# Patient Record
Sex: FEMALE | Race: WHITE | NOT HISPANIC OR LATINO | Employment: FULL TIME | RURAL
[De-identification: names, ages, dates, MRNs, and addresses within clinical notes are randomized per-mention and may not be internally consistent; named-entity substitution may affect disease eponyms.]

---

## 2023-04-12 ENCOUNTER — OFFICE VISIT (OUTPATIENT)
Dept: FAMILY MEDICINE CLINIC | Facility: CLINIC | Age: 33
End: 2023-04-12
Payer: COMMERCIAL

## 2023-04-12 VITALS
BODY MASS INDEX: 33.27 KG/M2 | HEIGHT: 66 IN | SYSTOLIC BLOOD PRESSURE: 108 MMHG | OXYGEN SATURATION: 98 % | DIASTOLIC BLOOD PRESSURE: 70 MMHG | WEIGHT: 207 LBS | HEART RATE: 55 BPM

## 2023-04-12 DIAGNOSIS — Z13.29 THYROID DISORDER SCREENING: ICD-10-CM

## 2023-04-12 DIAGNOSIS — F41.9 ANXIETY: ICD-10-CM

## 2023-04-12 DIAGNOSIS — Z13.220 LIPID SCREENING: ICD-10-CM

## 2023-04-12 DIAGNOSIS — Z13.1 DIABETES MELLITUS SCREENING: ICD-10-CM

## 2023-04-12 DIAGNOSIS — F33.1 MAJOR DEPRESSIVE DISORDER, RECURRENT, MODERATE: ICD-10-CM

## 2023-04-12 DIAGNOSIS — R05.3 CHRONIC COUGH: ICD-10-CM

## 2023-04-12 DIAGNOSIS — Z00.00 ROUTINE PHYSICAL EXAMINATION: Primary | ICD-10-CM

## 2023-04-12 RX ORDER — LORATADINE 10 MG/1
10 TABLET ORAL DAILY
Qty: 30 TABLET | Refills: 1 | Status: SHIPPED | OUTPATIENT
Start: 2023-04-12

## 2023-04-12 NOTE — PROGRESS NOTES
"Chief Complaint  Establish Care (Needs a PCP Had covid in December and cant get rid of the dry cough. )    Subjective          Neela Manning presents to [facility]  History of Present Illness  Patient in today to establish care and to discuss chronic cough since having covid 4-5 months ago. States is nonproductive. No fevers. States has had some postnasal congestion at times. She states has been quite some time since going to doctor. She is due for fasting labs and gyn exam. She would like to get fasting labs done today. She also would like to discuss ongoing symptoms of depression and mild anxiety that she states has had for years and is interested in trying medication. Denies SI/HI.  PHQ9 of 17.   Depression  Visit Type: initial  Onset of symptoms: more than 5 years ago  Progression since onset: unchanged  Patient presents with the following symptoms: depressed mood and nervousness/anxiety.  Patient is not experiencing: chest pain, feelings of hopelessness, feelings of worthlessness, obsessions, palpitations, shortness of breath, suicidal ideas and suicidal planning.        Objective   Vital Signs:   /70 (BP Location: Left arm, Patient Position: Sitting, Cuff Size: Large Adult)   Pulse 55   Ht 167.6 cm (66\")   Wt 93.9 kg (207 lb)   SpO2 98%   BMI 33.41 kg/m²     Body mass index is 33.41 kg/m².    Review of Systems   Constitutional: Positive for fatigue.   HENT: Positive for postnasal drip. Negative for congestion and sore throat.    Respiratory: Positive for cough. Negative for shortness of breath and wheezing.    Cardiovascular: Negative for chest pain and palpitations.   Gastrointestinal: Negative for abdominal pain, diarrhea, nausea and vomiting.   Neurological: Negative for dizziness and headache.   Psychiatric/Behavioral: Positive for depressed mood. Negative for suicidal ideas. The patient is nervous/anxious.        Past History:  Medical History: has no past medical " history on file.   Surgical History: has no past surgical history on file.   Family History: family history includes Hypertension in her mother.   Social History: reports that she quit smoking about 2 weeks ago. Her smoking use included cigarettes. She has never used smokeless tobacco. She reports that she does not drink alcohol and does not use drugs.      Current Outpatient Medications:   •  loratadine (Claritin) 10 MG tablet, Take 1 tablet by mouth Daily., Disp: 30 tablet, Rfl: 1  •  sertraline (Zoloft) 50 MG tablet, Take 1/2 tablet by oral route once a day x 1 week ;then 1 tablet by oral route once a day, Disp: 30 tablet, Rfl: 0  Allergies: Penicillins    Physical Exam  Constitutional:       Appearance: Normal appearance.   HENT:      Right Ear: Tympanic membrane normal.      Left Ear: Tympanic membrane normal.      Mouth/Throat:      Pharynx: Oropharynx is clear.   Eyes:      Conjunctiva/sclera: Conjunctivae normal.      Pupils: Pupils are equal, round, and reactive to light.   Cardiovascular:      Rate and Rhythm: Normal rate and regular rhythm.      Heart sounds: Normal heart sounds.   Pulmonary:      Effort: Pulmonary effort is normal.      Breath sounds: Normal breath sounds.   Abdominal:      Palpations: Abdomen is soft.      Tenderness: There is no abdominal tenderness.   Neurological:      Mental Status: She is alert and oriented to person, place, and time.   Psychiatric:         Attention and Perception: Attention normal.         Mood and Affect: Mood normal.         Behavior: Behavior normal.         Thought Content: Thought content normal.         Judgment: Judgment normal.             Assessment and Plan   Diagnoses and all orders for this visit:    1. Routine physical examination (Primary)  Encouraged healthy diet and exercise. Encouraged to get in for pap smear. She states is utd on eye and dental exam at this time. Will check fasting labs today.   2. Lipid screening    3. Thyroid disorder  screening    4. Diabetes mellitus screening    5. Chronic cough  -     XR Chest PA & Lateral (In Office)  Will check cxray today with persistent cough. Will try loratadine. Denies reflux symptoms. If cough not improving, recommend to get in with pulmonology.   6. Major depressive disorder, recurrent, moderate  She would like to try medication, declines counseling at this time but may consider. PHQ9 of 17--- she scored 1 on thoughts of self harm- patient states has had thought in past but denies currently having thoughts and states never had plan or intent to act on thought.  Discussed if any return of those thoughts would recommend ER eval or acute eval at [facility] if needed.  Will start on zoloft- discussed possible side effects of medication. Recheck in one month, prior as needed.   7. Anxiety    Other orders  -     loratadine (Claritin) 10 MG tablet; Take 1 tablet by mouth Daily.  Dispense: 30 tablet; Refill: 1  -     sertraline (Zoloft) 50 MG tablet; Take 1/2 tablet by oral route once a day x 1 week ;then 1 tablet by oral route once a day  Dispense: 30 tablet; Refill: 0            Follow Up   Return in about 1 month (around 5/12/2023).  Patient was given instructions and counseling regarding her condition or for health maintenance advice. Please see specific information pulled into the AVS if appropriate.     Julee Jean PA-C

## 2023-04-14 LAB
ALBUMIN SERPL-MCNC: 4.5 G/DL (ref 3.8–4.8)
ALBUMIN/GLOB SERPL: 2 {RATIO} (ref 1.2–2.2)
ALP SERPL-CCNC: 92 IU/L (ref 44–121)
ALT SERPL-CCNC: 17 IU/L (ref 0–32)
AST SERPL-CCNC: 17 IU/L (ref 0–40)
BASOPHILS # BLD AUTO: 0.1 X10E3/UL (ref 0–0.2)
BASOPHILS NFR BLD AUTO: 1 %
BILIRUB SERPL-MCNC: 0.2 MG/DL (ref 0–1.2)
BUN SERPL-MCNC: 11 MG/DL (ref 6–20)
BUN/CREAT SERPL: 14 (ref 9–23)
CALCIUM SERPL-MCNC: 9.3 MG/DL (ref 8.7–10.2)
CHLORIDE SERPL-SCNC: 104 MMOL/L (ref 96–106)
CHOLEST SERPL-MCNC: 159 MG/DL (ref 100–199)
CO2 SERPL-SCNC: 22 MMOL/L (ref 20–29)
CREAT SERPL-MCNC: 0.76 MG/DL (ref 0.57–1)
EGFRCR SERPLBLD CKD-EPI 2021: 107 ML/MIN/1.73
EOSINOPHIL # BLD AUTO: 0.1 X10E3/UL (ref 0–0.4)
EOSINOPHIL NFR BLD AUTO: 2 %
ERYTHROCYTE [DISTWIDTH] IN BLOOD BY AUTOMATED COUNT: 12.8 % (ref 11.7–15.4)
GLOBULIN SER CALC-MCNC: 2.3 G/DL (ref 1.5–4.5)
GLUCOSE SERPL-MCNC: 89 MG/DL (ref 70–99)
HBA1C MFR BLD: 5.3 % (ref 4.8–5.6)
HCT VFR BLD AUTO: 40.9 % (ref 34–46.6)
HDLC SERPL-MCNC: 40 MG/DL
HGB BLD-MCNC: 13.8 G/DL (ref 11.1–15.9)
IMM GRANULOCYTES # BLD AUTO: 0 X10E3/UL (ref 0–0.1)
IMM GRANULOCYTES NFR BLD AUTO: 1 %
LDLC SERPL CALC-MCNC: 102 MG/DL (ref 0–99)
LYMPHOCYTES # BLD AUTO: 2.2 X10E3/UL (ref 0.7–3.1)
LYMPHOCYTES NFR BLD AUTO: 33 %
MCH RBC QN AUTO: 28 PG (ref 26.6–33)
MCHC RBC AUTO-ENTMCNC: 33.7 G/DL (ref 31.5–35.7)
MCV RBC AUTO: 83 FL (ref 79–97)
MONOCYTES # BLD AUTO: 0.5 X10E3/UL (ref 0.1–0.9)
MONOCYTES NFR BLD AUTO: 7 %
NEUTROPHILS # BLD AUTO: 3.7 X10E3/UL (ref 1.4–7)
NEUTROPHILS NFR BLD AUTO: 56 %
PLATELET # BLD AUTO: 337 X10E3/UL (ref 150–450)
POTASSIUM SERPL-SCNC: 4.3 MMOL/L (ref 3.5–5.2)
PROT SERPL-MCNC: 6.8 G/DL (ref 6–8.5)
RBC # BLD AUTO: 4.92 X10E6/UL (ref 3.77–5.28)
SODIUM SERPL-SCNC: 142 MMOL/L (ref 134–144)
TRIGL SERPL-MCNC: 92 MG/DL (ref 0–149)
TSH SERPL DL<=0.005 MIU/L-ACNC: 1.86 UIU/ML (ref 0.45–4.5)
VLDLC SERPL CALC-MCNC: 17 MG/DL (ref 5–40)
WBC # BLD AUTO: 6.5 X10E3/UL (ref 3.4–10.8)

## 2023-05-10 ENCOUNTER — OFFICE VISIT (OUTPATIENT)
Dept: FAMILY MEDICINE CLINIC | Facility: CLINIC | Age: 33
End: 2023-05-10
Payer: COMMERCIAL

## 2023-05-10 VITALS
WEIGHT: 207 LBS | BODY MASS INDEX: 33.27 KG/M2 | HEIGHT: 66 IN | SYSTOLIC BLOOD PRESSURE: 108 MMHG | DIASTOLIC BLOOD PRESSURE: 80 MMHG | HEART RATE: 60 BPM | OXYGEN SATURATION: 97 %

## 2023-05-10 DIAGNOSIS — Z79.899 HIGH RISK MEDICATION USE: Primary | ICD-10-CM

## 2023-05-10 DIAGNOSIS — F41.9 ANXIETY: ICD-10-CM

## 2023-05-10 NOTE — PROGRESS NOTES
"Chief Complaint  Med Refill (One month follow up )    Subjective          Neela Manning presents to [facility]  History of Present Illness  Patient in today for medication recheck and refill on zoloft. She states has noticed improvement with her anxiety symptoms and seems to be handling daily stressors better. States coworkers have noticed improvement as well. She denies any side effects of medication.   Anxiety  Presents for follow-up visit. Symptoms include nervous/anxious behavior. Patient reports no chest pain, depressed mood, irritability, nausea, palpitations or shortness of breath.           Objective   Vital Signs:   /80 (BP Location: Left arm, Patient Position: Sitting, Cuff Size: Large Adult)   Pulse 60   Ht 167.6 cm (66\")   Wt 93.9 kg (207 lb)   SpO2 97%   BMI 33.41 kg/m²     Body mass index is 33.41 kg/m².    Review of Systems   Constitutional: Negative for fatigue and irritability.   HENT: Negative for congestion and sore throat.    Respiratory: Negative for cough and shortness of breath.    Cardiovascular: Negative for chest pain and palpitations.   Gastrointestinal: Negative for abdominal pain, diarrhea, nausea and vomiting.   Psychiatric/Behavioral: Negative for depressed mood. The patient is nervous/anxious.        Past History:  Medical History: has no past medical history on file.   Surgical History: has no past surgical history on file.   Family History: family history includes Hypertension in her mother.   Social History: reports that she has been smoking cigarettes. She has never used smokeless tobacco. She reports that she does not drink alcohol and does not use drugs.      Current Outpatient Medications:   •  loratadine (Claritin) 10 MG tablet, Take 1 tablet by mouth Daily., Disp: 30 tablet, Rfl: 1  •  sertraline (Zoloft) 50 MG tablet, Take 1 tablet by oral route once a day, Disp: 90 tablet, Rfl: 1  Allergies: Penicillins    Physical Exam  Constitutional:  "      Appearance: Normal appearance.   Eyes:      Conjunctiva/sclera: Conjunctivae normal.      Pupils: Pupils are equal, round, and reactive to light.   Cardiovascular:      Rate and Rhythm: Normal rate and regular rhythm.      Heart sounds: Normal heart sounds.   Pulmonary:      Effort: Pulmonary effort is normal.      Breath sounds: Normal breath sounds.   Abdominal:      Tenderness: There is no abdominal tenderness.   Neurological:      Mental Status: She is oriented to person, place, and time.   Psychiatric:         Mood and Affect: Mood normal.         Behavior: Behavior normal.             Assessment and Plan   Diagnoses and all orders for this visit:    1. High risk medication use (Primary)  She is pleased with current dosage of zoloft and would like to continue at this time. Denies side effects of medication.  RTC prior to recheck as needed.   2. Anxiety    Other orders  -     sertraline (Zoloft) 50 MG tablet; Take 1 tablet by oral route once a day  Dispense: 90 tablet; Refill: 1            Follow Up   Return in about 6 months (around 11/10/2023).  Patient was given instructions and counseling regarding her condition or for health maintenance advice. Please see specific information pulled into the AVS if appropriate.     Julee Jean PA-C

## 2023-11-07 ENCOUNTER — OFFICE VISIT (OUTPATIENT)
Dept: FAMILY MEDICINE CLINIC | Facility: CLINIC | Age: 33
End: 2023-11-07
Payer: COMMERCIAL

## 2023-11-07 VITALS
HEART RATE: 67 BPM | HEIGHT: 66 IN | OXYGEN SATURATION: 98 % | BODY MASS INDEX: 31.02 KG/M2 | SYSTOLIC BLOOD PRESSURE: 120 MMHG | DIASTOLIC BLOOD PRESSURE: 80 MMHG | WEIGHT: 193 LBS

## 2023-11-07 DIAGNOSIS — F33.1 MAJOR DEPRESSIVE DISORDER, RECURRENT, MODERATE: ICD-10-CM

## 2023-11-07 DIAGNOSIS — F41.9 ANXIETY: ICD-10-CM

## 2023-11-07 DIAGNOSIS — Z79.899 HIGH RISK MEDICATION USE: Primary | ICD-10-CM

## 2023-11-07 NOTE — PROGRESS NOTES
"Chief Complaint  Anxiety    Subjective          Neela Manning presents to [facility]  History of Present Illness  Patient in today for medication recheck and refills. She states feels may need to bump up the dosage a bit on zoloft as feels some of the depression symptoms have returned. Feels anxiety is well controlled. Denies SI/HI. She states also may consider getting in with therapy.    Depression  Visit Type: follow-up  Patient presents with the following symptoms: depressed mood and nervousness/anxiety.  Patient is not experiencing: chest pain, palpitations, shortness of breath and suicidal ideas.          Objective   Vital Signs:   /80   Pulse 67   Ht 167.6 cm (66\")   Wt 87.5 kg (193 lb)   SpO2 98%   BMI 31.15 kg/m²     Body mass index is 31.15 kg/m².    Review of Systems   Constitutional:  Negative for fatigue.   HENT:  Negative for congestion and sore throat.    Respiratory:  Negative for cough and shortness of breath.    Cardiovascular:  Negative for palpitations.   Gastrointestinal:  Negative for abdominal pain, diarrhea, nausea and vomiting.   Neurological:  Negative for dizziness and headache.   Psychiatric/Behavioral:  Positive for depressed mood. Negative for suicidal ideas. The patient is nervous/anxious.        Past History:  Medical History: has no past medical history on file.   Surgical History: has no past surgical history on file.   Family History: family history includes Hypertension in her mother.   Social History: reports that she quit smoking about 3 months ago. Her smoking use included cigarettes. She has never used smokeless tobacco. She reports that she does not drink alcohol and does not use drugs.      Current Outpatient Medications:     loratadine (Claritin) 10 MG tablet, Take 1 tablet by mouth Daily., Disp: 30 tablet, Rfl: 1    sertraline (Zoloft) 50 MG tablet, Take 1 and 1/2  tablets by oral route once a day, Disp: 45 tablet, Rfl: 0  Allergies: " Penicillins    Physical Exam  Constitutional:       Appearance: Normal appearance.   Eyes:      Conjunctiva/sclera: Conjunctivae normal.      Pupils: Pupils are equal, round, and reactive to light.   Cardiovascular:      Rate and Rhythm: Normal rate and regular rhythm.      Heart sounds: Normal heart sounds.   Pulmonary:      Effort: Pulmonary effort is normal.      Breath sounds: Normal breath sounds.   Abdominal:      Tenderness: There is no abdominal tenderness.   Neurological:      Mental Status: She is oriented to person, place, and time.   Psychiatric:         Mood and Affect: Mood normal.         Behavior: Behavior normal.             Assessment and Plan   Diagnoses and all orders for this visit:    1. High risk medication use (Primary)  Will increase sertraline to 75 mg/day- monitor symptoms and recheck in one month; prior as needed; also have given some contact information for counseling if she would like to consider   2. Anxiety    3. Major depressive disorder, recurrent, moderate    Other orders  -     sertraline (Zoloft) 50 MG tablet; Take 1 and 1/2  tablets by oral route once a day  Dispense: 45 tablet; Refill: 0            Follow Up   Return in about 1 month (around 12/7/2023) for Recheck.  Patient was given instructions and counseling regarding her condition or for health maintenance advice. Please see specific information pulled into the AVS if appropriate.     Julee Jean PA-C

## 2023-12-06 ENCOUNTER — OFFICE VISIT (OUTPATIENT)
Dept: FAMILY MEDICINE CLINIC | Facility: CLINIC | Age: 33
End: 2023-12-06
Payer: COMMERCIAL

## 2023-12-06 VITALS
BODY MASS INDEX: 30.44 KG/M2 | DIASTOLIC BLOOD PRESSURE: 82 MMHG | HEIGHT: 66 IN | HEART RATE: 89 BPM | SYSTOLIC BLOOD PRESSURE: 120 MMHG | WEIGHT: 189.4 LBS | OXYGEN SATURATION: 97 %

## 2023-12-06 DIAGNOSIS — F33.1 MAJOR DEPRESSIVE DISORDER, RECURRENT, MODERATE: ICD-10-CM

## 2023-12-06 DIAGNOSIS — Z79.899 HIGH RISK MEDICATION USE: Primary | ICD-10-CM

## 2023-12-06 DIAGNOSIS — F41.9 ANXIETY: ICD-10-CM

## 2023-12-06 RX ORDER — SERTRALINE HYDROCHLORIDE 100 MG/1
100 TABLET, FILM COATED ORAL DAILY
Qty: 30 TABLET | Refills: 0 | Status: SHIPPED | OUTPATIENT
Start: 2023-12-06

## 2023-12-06 NOTE — PROGRESS NOTES
"Chief Complaint  Anxiety (Med recheck )    Subjective          Neela Manning presents to [facility]  History of Present Illness  Patient in today to recheck on medication for anxiety and depression. She feels the sertraline has helped but feels could still be a bit improved and would like to increase the dosage a bit. Denies side effects of medication. She is considering getting in with counselor as well. She denies SI/HI.   Anxiety  Presents for follow-up visit. Symptoms include depressed mood and nervous/anxious behavior. Patient reports no chest pain, dizziness, irritability, nausea, palpitations, shortness of breath or suicidal ideas.     Her past medical history is significant for depression.   Depression  Visit Type: follow-up  Patient presents with the following symptoms: depressed mood and nervousness/anxiety.  Patient is not experiencing: chest pain, irritability, palpitations, shortness of breath and suicidal ideas.          Objective   Vital Signs:   /82   Pulse 89   Ht 167.6 cm (66\")   Wt 85.9 kg (189 lb 6.4 oz)   SpO2 97%   BMI 30.57 kg/m²     Body mass index is 30.57 kg/m².    Review of Systems   Constitutional:  Negative for fever and irritability.   HENT:  Negative for congestion and sore throat.    Respiratory:  Negative for shortness of breath.    Cardiovascular:  Negative for chest pain and palpitations.   Gastrointestinal:  Negative for abdominal pain, diarrhea, nausea and vomiting.   Neurological:  Negative for dizziness and headache.   Psychiatric/Behavioral:  Positive for depressed mood. Negative for suicidal ideas. The patient is nervous/anxious.        Past History:  Medical History: has a past medical history of Anxiety and Depression.   Surgical History: has a past surgical history that includes Ear Tubes Removal (Bilateral).   Family History: family history includes Hypertension in her mother; Hyperthyroidism in her mother.   Social History: reports " that she quit smoking about 4 months ago. Her smoking use included cigarettes. She has a 0.50 pack-year smoking history. She has never used smokeless tobacco. She reports that she does not drink alcohol and does not use drugs.      Current Outpatient Medications:     loratadine (Claritin) 10 MG tablet, Take 1 tablet by mouth Daily., Disp: 30 tablet, Rfl: 1    sertraline (Zoloft) 100 MG tablet, Take 1 tablet by mouth Daily., Disp: 30 tablet, Rfl: 0  Allergies: Penicillins    Physical Exam  Constitutional:       Appearance: Normal appearance.   HENT:      Nose: Nose normal.      Mouth/Throat:      Mouth: Mucous membranes are moist.   Eyes:      Conjunctiva/sclera: Conjunctivae normal.      Pupils: Pupils are equal, round, and reactive to light.   Cardiovascular:      Rate and Rhythm: Normal rate and regular rhythm.      Heart sounds: Normal heart sounds.   Pulmonary:      Effort: Pulmonary effort is normal.      Breath sounds: Normal breath sounds.   Abdominal:      Tenderness: There is no abdominal tenderness.   Neurological:      Mental Status: She is oriented to person, place, and time.   Psychiatric:         Mood and Affect: Mood normal.         Behavior: Behavior normal.             Assessment and Plan   Diagnoses and all orders for this visit:    1. High risk medication use (Primary)  Will increase dosage to 100 mg on sertraline. Discussed possible side effects. RTC in one month for recheck, prior as needed- she states may consider counseling but will need to wait until next month.  2. Anxiety    3. Major depressive disorder, recurrent, moderate    Other orders  -     sertraline (Zoloft) 100 MG tablet; Take 1 tablet by mouth Daily.  Dispense: 30 tablet; Refill: 0            Follow Up   Return in about 1 month (around 1/6/2024) for Recheck.  Patient was given instructions and counseling regarding her condition or for health maintenance advice. Please see specific information pulled into the AVS if appropriate.      Julee Jean PA-C

## 2024-01-05 ENCOUNTER — OFFICE VISIT (OUTPATIENT)
Dept: FAMILY MEDICINE CLINIC | Facility: CLINIC | Age: 34
End: 2024-01-05
Payer: COMMERCIAL

## 2024-01-05 VITALS
WEIGHT: 191 LBS | DIASTOLIC BLOOD PRESSURE: 80 MMHG | HEIGHT: 66 IN | HEART RATE: 95 BPM | OXYGEN SATURATION: 97 % | SYSTOLIC BLOOD PRESSURE: 104 MMHG | BODY MASS INDEX: 30.7 KG/M2

## 2024-01-05 DIAGNOSIS — F33.1 MAJOR DEPRESSIVE DISORDER, RECURRENT, MODERATE: ICD-10-CM

## 2024-01-05 DIAGNOSIS — Z79.899 HIGH RISK MEDICATION USE: Primary | ICD-10-CM

## 2024-01-05 DIAGNOSIS — F41.9 ANXIETY: ICD-10-CM

## 2024-01-05 RX ORDER — SERTRALINE HYDROCHLORIDE 100 MG/1
100 TABLET, FILM COATED ORAL DAILY
Qty: 90 TABLET | Refills: 1 | Status: SHIPPED | OUTPATIENT
Start: 2024-01-05

## 2024-01-06 NOTE — PROGRESS NOTES
"Chief Complaint  Anxiety (One month follow up pt states new dosage is working well )    Subjective          Neela Manning presents to [facility]  History of Present Illness  Patient in today for medication recheck and refill. States the increased dosage of sertraline is working better for her depression and anxiety symptoms and would like to continue at this time. Denies side effects of medication.   Anxiety  Presents for follow-up visit. Symptoms include depressed mood and nervous/anxious behavior. Patient reports no chest pain, dizziness, irritability, nausea or shortness of breath.           Objective   Vital Signs:   /80   Pulse 95   Ht 167.6 cm (66\")   Wt 86.6 kg (191 lb)   SpO2 97%   BMI 30.83 kg/m²     Body mass index is 30.83 kg/m².    Review of Systems   Constitutional:  Negative for fever and irritability.   HENT:  Negative for congestion and sore throat.    Respiratory:  Negative for shortness of breath.    Cardiovascular:  Negative for chest pain.   Gastrointestinal:  Negative for abdominal pain, diarrhea, nausea and vomiting.   Neurological:  Negative for dizziness and headache.   Psychiatric/Behavioral:  Positive for depressed mood. The patient is nervous/anxious.        Past History:  Medical History: has a past medical history of Anxiety and Depression.   Surgical History: has a past surgical history that includes Ear Tubes Removal (Bilateral).   Family History: family history includes Hypertension in her mother; Hyperthyroidism in her mother.   Social History: reports that she quit smoking about 5 months ago. Her smoking use included cigarettes. She has a 0.50 pack-year smoking history. She has never used smokeless tobacco. She reports that she does not drink alcohol and does not use drugs.      Current Outpatient Medications:     loratadine (Claritin) 10 MG tablet, Take 1 tablet by mouth Daily., Disp: 30 tablet, Rfl: 1    sertraline (Zoloft) 100 MG tablet, Take " 1 tablet by mouth Daily., Disp: 90 tablet, Rfl: 1  Allergies: Penicillins    Physical Exam  Constitutional:       Appearance: Normal appearance.   Cardiovascular:      Rate and Rhythm: Normal rate and regular rhythm.      Heart sounds: Normal heart sounds.   Pulmonary:      Effort: Pulmonary effort is normal.      Breath sounds: Normal breath sounds.   Neurological:      Mental Status: She is oriented to person, place, and time.   Psychiatric:         Mood and Affect: Mood normal.         Behavior: Behavior normal.             Assessment and Plan   Diagnoses and all orders for this visit:    1. High risk medication use (Primary)  She is pleased with the current dosage of sertraline and would like to continue at this time. Denies side effects of medication. RTC prior to recheck as needed.   2. Anxiety    3. Major depressive disorder, recurrent, moderate    Other orders  -     sertraline (Zoloft) 100 MG tablet; Take 1 tablet by mouth Daily.  Dispense: 90 tablet; Refill: 1            Follow Up   No follow-ups on file.  Patient was given instructions and counseling regarding her condition or for health maintenance advice. Please see specific information pulled into the AVS if appropriate.     Julee Jean PA-C

## 2024-07-03 ENCOUNTER — OFFICE VISIT (OUTPATIENT)
Dept: FAMILY MEDICINE CLINIC | Facility: CLINIC | Age: 34
End: 2024-07-03
Payer: COMMERCIAL

## 2024-07-03 VITALS
SYSTOLIC BLOOD PRESSURE: 112 MMHG | WEIGHT: 190 LBS | HEIGHT: 66 IN | DIASTOLIC BLOOD PRESSURE: 70 MMHG | OXYGEN SATURATION: 100 % | BODY MASS INDEX: 30.53 KG/M2 | HEART RATE: 62 BPM

## 2024-07-03 DIAGNOSIS — Z13.220 LIPID SCREENING: ICD-10-CM

## 2024-07-03 DIAGNOSIS — Z11.59 ENCOUNTER FOR HEPATITIS C SCREENING TEST FOR LOW RISK PATIENT: ICD-10-CM

## 2024-07-03 DIAGNOSIS — Z72.0 TOBACCO USE: ICD-10-CM

## 2024-07-03 DIAGNOSIS — F33.1 MAJOR DEPRESSIVE DISORDER, RECURRENT, MODERATE: ICD-10-CM

## 2024-07-03 DIAGNOSIS — Z13.29 THYROID DISORDER SCREENING: ICD-10-CM

## 2024-07-03 DIAGNOSIS — Z13.1 DIABETES MELLITUS SCREENING: ICD-10-CM

## 2024-07-03 DIAGNOSIS — F41.1 GENERALIZED ANXIETY DISORDER: ICD-10-CM

## 2024-07-03 DIAGNOSIS — Z00.00 ROUTINE PHYSICAL EXAMINATION: Primary | ICD-10-CM

## 2024-07-03 PROCEDURE — 99395 PREV VISIT EST AGE 18-39: CPT | Performed by: PHYSICIAN ASSISTANT

## 2024-07-03 RX ORDER — SERTRALINE HYDROCHLORIDE 100 MG/1
100 TABLET, FILM COATED ORAL DAILY
Qty: 90 TABLET | Refills: 1 | Status: SHIPPED | OUTPATIENT
Start: 2024-07-03

## 2024-07-03 NOTE — PROGRESS NOTES
"Chief Complaint  Anxiety (Med check ) and Annual Exam    Subjective          Neela Manning presents to [facility]  History of Present Illness  Patient in today for routine physical exam and fasting labs.  has been feeling well. She is due for gyn exam and states will call to schedule own appointment. She states the sertraline has continued to help with her anxiety and depression symptoms and would like to continue. Denies side effects of medication.   Anxiety  Presents for follow-up visit.  Symptoms include depressed mood and nervous/anxious behavior.  Patient reports no chest pain, dizziness, nausea, palpitations, shortness of breath or suicidal ideas. Her past medical history is significant for depression.   Depression  Presents for follow-up visit. Symptoms include depressed mood and nervousness/anxiety. Patient is not experiencing: palpitations, shortness of breath, suicidal ideas, suicidal planning, thoughts of death, chest pain, dizziness and nausea.  Her past medical history is significant for depression.       Objective   Vital Signs:   /70   Pulse 62   Ht 167.6 cm (66\")   Wt 86.2 kg (190 lb)   SpO2 100%   BMI 30.67 kg/m²     Body mass index is 30.67 kg/m².    Review of Systems   Constitutional:  Negative for fatigue and fever.   HENT:  Negative for congestion and sore throat.    Respiratory:  Negative for cough and shortness of breath.    Cardiovascular:  Negative for chest pain and palpitations.   Gastrointestinal:  Negative for abdominal pain, diarrhea, nausea and vomiting.   Genitourinary:  Negative for dysuria.   Neurological:  Negative for dizziness and headache.   Psychiatric/Behavioral:  Positive for depressed mood. Negative for suicidal ideas. The patient is nervous/anxious.        Past History:  Medical History: has a past medical history of Anxiety and Depression.   Surgical History: has a past surgical history that includes Ear Tubes Removal (Bilateral). "   Family History: family history includes Hypertension in her mother; Hyperthyroidism in her mother.   Social History: reports that she quit smoking about 11 months ago. Her smoking use included cigarettes. She started smoking about 2 years ago. She has a 0.5 pack-year smoking history. She has never used smokeless tobacco. She reports that she does not drink alcohol and does not use drugs.      Current Outpatient Medications:     loratadine (Claritin) 10 MG tablet, Take 1 tablet by mouth Daily., Disp: 30 tablet, Rfl: 1    sertraline (Zoloft) 100 MG tablet, Take 1 tablet by mouth Daily., Disp: 90 tablet, Rfl: 1  Allergies: Penicillins    Physical Exam  Constitutional:       Appearance: Normal appearance.   HENT:      Right Ear: Tympanic membrane normal.      Left Ear: Tympanic membrane normal.      Mouth/Throat:      Pharynx: Oropharynx is clear.   Eyes:      Conjunctiva/sclera: Conjunctivae normal.      Pupils: Pupils are equal, round, and reactive to light.   Cardiovascular:      Rate and Rhythm: Normal rate and regular rhythm.      Heart sounds: Normal heart sounds.   Pulmonary:      Effort: Pulmonary effort is normal.      Breath sounds: Normal breath sounds.   Abdominal:      Palpations: Abdomen is soft.      Tenderness: There is no abdominal tenderness.   Neurological:      Mental Status: She is oriented to person, place, and time.   Psychiatric:         Mood and Affect: Mood normal.         Behavior: Behavior normal.             Assessment and Plan   Diagnoses and all orders for this visit:    1. Routine physical examination (Primary)  -     CBC & Differential  -     Comprehensive Metabolic Panel  Encouraged healthy diet and exercise. Fasting labs today. Encouraged to get in for pap smear- patient states will call to schedule own appointment.   2. Major depressive disorder, recurrent, moderate  Refilled sertraline- denies side effects; rtc prior to recheck as needed   3. Generalized anxiety disorder    4.  Lipid screening  -     Lipid Panel  Will check fasting lipid panel   5. Thyroid disorder screening  -     TSH  Will check TSH with labs   6. Diabetes mellitus screening  -     Hemoglobin A1c  Will check hga1c with labs   7. Encounter for hepatitis C screening test for low risk patient  -     Hepatitis C Antibody    8. Tobacco use  Encouraged cessation for vaping.   Other orders  -     sertraline (Zoloft) 100 MG tablet; Take 1 tablet by mouth Daily.  Dispense: 90 tablet; Refill: 1            Follow Up   Return in about 6 months (around 1/3/2025).  Patient was given instructions and counseling regarding her condition or for health maintenance advice. Please see specific information pulled into the AVS if appropriate.     Julee Jean PA-C

## 2024-07-04 LAB
ALBUMIN SERPL-MCNC: 4.3 G/DL (ref 3.9–4.9)
ALP SERPL-CCNC: 75 IU/L (ref 44–121)
ALT SERPL-CCNC: 12 IU/L (ref 0–32)
AST SERPL-CCNC: 11 IU/L (ref 0–40)
BASOPHILS # BLD AUTO: 0.1 X10E3/UL (ref 0–0.2)
BASOPHILS NFR BLD AUTO: 1 %
BILIRUB SERPL-MCNC: <0.2 MG/DL (ref 0–1.2)
BUN SERPL-MCNC: 10 MG/DL (ref 6–20)
BUN/CREAT SERPL: 12 (ref 9–23)
CALCIUM SERPL-MCNC: 9.3 MG/DL (ref 8.7–10.2)
CHLORIDE SERPL-SCNC: 106 MMOL/L (ref 96–106)
CHOLEST SERPL-MCNC: 167 MG/DL (ref 100–199)
CO2 SERPL-SCNC: 24 MMOL/L (ref 20–29)
CREAT SERPL-MCNC: 0.81 MG/DL (ref 0.57–1)
EGFRCR SERPLBLD CKD-EPI 2021: 98 ML/MIN/1.73
EOSINOPHIL # BLD AUTO: 0.1 X10E3/UL (ref 0–0.4)
EOSINOPHIL NFR BLD AUTO: 2 %
ERYTHROCYTE [DISTWIDTH] IN BLOOD BY AUTOMATED COUNT: 12.2 % (ref 11.7–15.4)
GLOBULIN SER CALC-MCNC: 2.3 G/DL (ref 1.5–4.5)
GLUCOSE SERPL-MCNC: 103 MG/DL (ref 70–99)
HBA1C MFR BLD: 5.4 % (ref 4.8–5.6)
HCT VFR BLD AUTO: 41.3 % (ref 34–46.6)
HCV IGG SERPL QL IA: NON REACTIVE
HDLC SERPL-MCNC: 43 MG/DL
HGB BLD-MCNC: 13.7 G/DL (ref 11.1–15.9)
IMM GRANULOCYTES # BLD AUTO: 0 X10E3/UL (ref 0–0.1)
IMM GRANULOCYTES NFR BLD AUTO: 0 %
LDLC SERPL CALC-MCNC: 107 MG/DL (ref 0–99)
LYMPHOCYTES # BLD AUTO: 2.2 X10E3/UL (ref 0.7–3.1)
LYMPHOCYTES NFR BLD AUTO: 29 %
MCH RBC QN AUTO: 28.9 PG (ref 26.6–33)
MCHC RBC AUTO-ENTMCNC: 33.2 G/DL (ref 31.5–35.7)
MCV RBC AUTO: 87 FL (ref 79–97)
MONOCYTES # BLD AUTO: 0.5 X10E3/UL (ref 0.1–0.9)
MONOCYTES NFR BLD AUTO: 7 %
NEUTROPHILS # BLD AUTO: 4.7 X10E3/UL (ref 1.4–7)
NEUTROPHILS NFR BLD AUTO: 61 %
PLATELET # BLD AUTO: 293 X10E3/UL (ref 150–450)
POTASSIUM SERPL-SCNC: 4.6 MMOL/L (ref 3.5–5.2)
PROT SERPL-MCNC: 6.6 G/DL (ref 6–8.5)
RBC # BLD AUTO: 4.74 X10E6/UL (ref 3.77–5.28)
SODIUM SERPL-SCNC: 141 MMOL/L (ref 134–144)
TRIGL SERPL-MCNC: 89 MG/DL (ref 0–149)
TSH SERPL DL<=0.005 MIU/L-ACNC: 1.89 UIU/ML (ref 0.45–4.5)
VLDLC SERPL CALC-MCNC: 17 MG/DL (ref 5–40)
WBC # BLD AUTO: 7.6 X10E3/UL (ref 3.4–10.8)

## 2025-01-06 ENCOUNTER — TELEMEDICINE (OUTPATIENT)
Dept: FAMILY MEDICINE CLINIC | Facility: CLINIC | Age: 35
End: 2025-01-06
Payer: COMMERCIAL

## 2025-01-06 DIAGNOSIS — F41.1 GENERALIZED ANXIETY DISORDER: ICD-10-CM

## 2025-01-06 DIAGNOSIS — Z79.899 HIGH RISK MEDICATION USE: Primary | ICD-10-CM

## 2025-01-06 PROCEDURE — 99214 OFFICE O/P EST MOD 30 MIN: CPT | Performed by: PHYSICIAN ASSISTANT

## 2025-01-06 RX ORDER — SERTRALINE HYDROCHLORIDE 100 MG/1
100 TABLET, FILM COATED ORAL DAILY
Qty: 90 TABLET | Refills: 1 | Status: SHIPPED | OUTPATIENT
Start: 2025-01-06

## 2025-01-06 NOTE — PROGRESS NOTES
"Chief Complaint  Anxiety    Subjective           Neela Manning presents to [facility]  History of Present Illness  [facility] with [facility]. She is in today for recheck on anxiety . She states the sertraline continues to work well for her anxiety symptoms and would like to continue on current dosage. Denies side effects of medication.   Anxiety  Presents for follow-up visit.  Symptoms include nervous/anxious behavior.  Patient reports no chest pain, depressed mood or nausea.     Review of Systems   Constitutional:  Negative for fatigue.   Cardiovascular:  Negative for chest pain.   Gastrointestinal:  Negative for diarrhea, nausea and vomiting.   Neurological:  Negative for headache.   Psychiatric/Behavioral:  Negative for depressed mood. The patient is nervous/anxious.       Objective   Vital Signs:   There were no vitals taken for this visit.    Estimated body mass index is 30.67 kg/m² as calculated from the following:    Height as of 7/3/24: 167.6 cm (66\").    Weight as of 7/3/24: 86.2 kg (190 lb).     Physical Exam   Constitutional: She appears well-developed and well-nourished.   Pulmonary/Chest: Effort normal.   Psychiatric: She has a normal mood and affect.     Result Review :                   Assessment and Plan      Diagnoses and all orders for this visit:    1. High risk medication use (Primary)  Discussed limitation of virtual visit. She is pleased with the sertraline at current dosage. Denies side effects. Will continue on current dosage and rtc prior to recheck as needed.   2. Generalized anxiety disorder    Other orders  -     sertraline (Zoloft) 100 MG tablet; Take 1 tablet by mouth Daily.  Dispense: 90 tablet; Refill: 1        Follow Up     No follow-ups on file.  Patient was given instructions and counseling regarding her condition or for health maintenance advice. Please see specific information pulled into the AVS if appropriate.     Mode of " Visit: Video  Location of patient: home  Location of provider: home  You have chosen to receive care through a telehealth visit.  The patient has signed the video visit consent form.  The visit included audio and video interaction. No technical issues occurred during this visit.

## 2025-01-08 RX ORDER — NORETHINDRONE ACETATE/ETHINYL ESTRADIOL AND FERROUS FUMARATE 1.5-30(21)
1 KIT ORAL DAILY
COMMUNITY
Start: 2024-12-17

## 2025-06-18 ENCOUNTER — OFFICE VISIT (OUTPATIENT)
Dept: FAMILY MEDICINE CLINIC | Facility: CLINIC | Age: 35
End: 2025-06-18
Payer: COMMERCIAL

## 2025-06-18 VITALS
SYSTOLIC BLOOD PRESSURE: 120 MMHG | OXYGEN SATURATION: 98 % | TEMPERATURE: 99.5 F | HEART RATE: 77 BPM | HEIGHT: 66 IN | DIASTOLIC BLOOD PRESSURE: 74 MMHG | WEIGHT: 184 LBS | BODY MASS INDEX: 29.57 KG/M2

## 2025-06-18 DIAGNOSIS — F41.1 GENERALIZED ANXIETY DISORDER: ICD-10-CM

## 2025-06-18 DIAGNOSIS — R59.9 ENLARGED LYMPH NODE: ICD-10-CM

## 2025-06-18 DIAGNOSIS — J02.9 SORE THROAT: Primary | ICD-10-CM

## 2025-06-18 LAB
EXPIRATION DATE: NORMAL
INTERNAL CONTROL: NORMAL
Lab: NORMAL
S PYO AG THROAT QL: NEGATIVE

## 2025-06-18 PROCEDURE — 99214 OFFICE O/P EST MOD 30 MIN: CPT | Performed by: PHYSICIAN ASSISTANT

## 2025-06-18 PROCEDURE — 87880 STREP A ASSAY W/OPTIC: CPT | Performed by: PHYSICIAN ASSISTANT

## 2025-06-18 RX ORDER — SERTRALINE HYDROCHLORIDE 100 MG/1
100 TABLET, FILM COATED ORAL DAILY
Qty: 90 TABLET | Refills: 1 | Status: SHIPPED | OUTPATIENT
Start: 2025-06-18

## 2025-06-18 RX ORDER — AZITHROMYCIN 250 MG/1
TABLET, FILM COATED ORAL
Qty: 6 TABLET | Refills: 0 | Status: SHIPPED | OUTPATIENT
Start: 2025-06-18

## 2025-06-18 NOTE — PROGRESS NOTES
"Chief Complaint  Cyst (Pt states woke up Wednesday with a knot on back of neck.)    Subjective          Neela Manning presents to [facility]  History of Present Illness  Patient in today for evaluation on swollen area to posterior right side of neck and she also noticed some swelling to the left side of her neck over the past week.  She states she did have a scratchy sore throat and associated that with running out of her allergy medication.  She denies any nausea, vomiting, or diarrhea.  She states she did have a cyst removed to the left side of her neck near this location several years ago. Denies any redness or heat to areas. Feels like the one on posterior neck has gotten a bit smaller than when it started.  While here, she is also needing to get a refill on sertraline. States it continues to work well for anxiety and would like to continue.   Cyst  Symptoms are new.   Onset was in the past 7 days.   Symptoms include sore throat.    Pertinent negative symptoms include no abdominal pain, no chest pain, no cough, no fever, no nausea, no rash and no vomiting.       Objective   Vital Signs:   /74   Pulse 77   Temp 99.5 °F (37.5 °C)   Ht 167.6 cm (66\")   Wt 83.5 kg (184 lb)   SpO2 98%   BMI 29.70 kg/m²     Body mass index is 29.7 kg/m².    Review of Systems   Constitutional:  Negative for fever.   HENT:  Positive for sore throat.    Respiratory:  Negative for cough.    Cardiovascular:  Negative for chest pain.   Gastrointestinal:  Negative for abdominal pain, diarrhea, nausea and vomiting.   Skin:  Negative for rash.   Neurological:  Negative for dizziness.   Psychiatric/Behavioral:  Negative for depressed mood. The patient is nervous/anxious.        Past History:  Medical History: has a past medical history of Anxiety and Depression.   Surgical History: has a past surgical history that includes Ear Tubes Removal (Bilateral).   Family History: family history includes Hypertension " in her mother; Hyperthyroidism in her mother.   Social History: reports that she quit smoking about 22 months ago. Her smoking use included cigarettes. She started smoking about 3 years ago. She has a 0.5 pack-year smoking history. She has never used smokeless tobacco. She reports that she does not drink alcohol and does not use drugs.      Current Outpatient Medications:     loratadine (Claritin) 10 MG tablet, Take 1 tablet by mouth Daily., Disp: 30 tablet, Rfl: 1    sertraline (Zoloft) 100 MG tablet, Take 1 tablet by mouth Daily., Disp: 90 tablet, Rfl: 1  Allergies: Penicillins    Physical Exam  Constitutional:       Appearance: Normal appearance.   HENT:      Head:      Comments: Enlarged lymph node left anterior cervical chain; palpable node vs cyst to right posterior cervical chain      Right Ear: Tympanic membrane normal.      Left Ear: Tympanic membrane normal.      Mouth/Throat:      Mouth: Mucous membranes are moist.      Comments: Minimal erythema to posterior throat  Cardiovascular:      Rate and Rhythm: Normal rate and regular rhythm.      Heart sounds: Normal heart sounds.   Pulmonary:      Effort: Pulmonary effort is normal.      Breath sounds: Normal breath sounds.   Neurological:      Mental Status: She is alert and oriented to person, place, and time.   Psychiatric:         Mood and Affect: Mood normal.         Behavior: Behavior normal.             Assessment and Plan   Diagnoses and all orders for this visit:    1. Sore throat (Primary)  Rapid strep screen was negative; will send culture; will start on antibiotic until culture and labs have returned with swelling noted to these areas; monitor closely; if symptoms not improving recommend further evaluation   2. Enlarged lymph node    3. Generalized anxiety disorder  Refilled sertraline- denies side effects of medication; rtc prior to recheck as needed   Other orders  -     sertraline (Zoloft) 100 MG tablet; Take 1 tablet by mouth Daily.  Dispense:  90 tablet; Refill: 1            Follow Up   No follow-ups on file.  Patient was given instructions and counseling regarding her condition or for health maintenance advice. Please see specific information pulled into the AVS if appropriate.     Julee Jean PA-C

## 2025-06-19 DIAGNOSIS — R74.8 ELEVATED LIVER ENZYMES: Primary | ICD-10-CM

## 2025-06-19 LAB
ALBUMIN SERPL-MCNC: 3.9 G/DL (ref 3.9–4.9)
ALP SERPL-CCNC: 168 IU/L (ref 44–121)
ALT SERPL-CCNC: 198 IU/L (ref 0–32)
AST SERPL-CCNC: 205 IU/L (ref 0–40)
BASOPHILS # BLD AUTO: 0.1 X10E3/UL (ref 0–0.2)
BASOPHILS NFR BLD AUTO: 2 %
BILIRUB SERPL-MCNC: 0.3 MG/DL (ref 0–1.2)
BUN SERPL-MCNC: 10 MG/DL (ref 6–20)
BUN/CREAT SERPL: 13 (ref 9–23)
CALCIUM SERPL-MCNC: 8.6 MG/DL (ref 8.7–10.2)
CHLORIDE SERPL-SCNC: 104 MMOL/L (ref 96–106)
CMV IGG SERPL IA-ACNC: <0.6 U/ML (ref 0–0.59)
CMV IGM SERPL IA-ACNC: <30 AU/ML (ref 0–29.9)
CO2 SERPL-SCNC: 22 MMOL/L (ref 20–29)
CREAT SERPL-MCNC: 0.78 MG/DL (ref 0.57–1)
EBV VCA IGG SER IA-ACNC: <18 U/ML (ref 0–17.9)
EBV VCA IGM SER IA-ACNC: <36 U/ML (ref 0–35.9)
EGFRCR SERPLBLD CKD-EPI 2021: 102 ML/MIN/1.73
EOSINOPHIL # BLD AUTO: 0.1 X10E3/UL (ref 0–0.4)
EOSINOPHIL NFR BLD AUTO: 2 %
ERYTHROCYTE [DISTWIDTH] IN BLOOD BY AUTOMATED COUNT: 12.8 % (ref 11.7–15.4)
GLOBULIN SER CALC-MCNC: 2.4 G/DL (ref 1.5–4.5)
GLUCOSE SERPL-MCNC: 94 MG/DL (ref 70–99)
HCT VFR BLD AUTO: 39 % (ref 34–46.6)
HGB BLD-MCNC: 12.9 G/DL (ref 11.1–15.9)
IMM GRANULOCYTES # BLD AUTO: 0 X10E3/UL (ref 0–0.1)
IMM GRANULOCYTES NFR BLD AUTO: 0 %
LYMPHOCYTES # BLD AUTO: 1.1 X10E3/UL (ref 0.7–3.1)
LYMPHOCYTES NFR BLD AUTO: 36 %
MCH RBC QN AUTO: 28.3 PG (ref 26.6–33)
MCHC RBC AUTO-ENTMCNC: 33.1 G/DL (ref 31.5–35.7)
MCV RBC AUTO: 86 FL (ref 79–97)
MONOCYTES # BLD AUTO: 0.3 X10E3/UL (ref 0.1–0.9)
MONOCYTES NFR BLD AUTO: 9 %
MORPHOLOGY BLD-IMP: ABNORMAL
NEUTROPHILS # BLD AUTO: 1.5 X10E3/UL (ref 1.4–7)
NEUTROPHILS NFR BLD AUTO: 51 %
PLATELET # BLD AUTO: 159 X10E3/UL (ref 150–450)
POTASSIUM SERPL-SCNC: 4.5 MMOL/L (ref 3.5–5.2)
PROT SERPL-MCNC: 6.3 G/DL (ref 6–8.5)
RBC # BLD AUTO: 4.56 X10E6/UL (ref 3.77–5.28)
SODIUM SERPL-SCNC: 140 MMOL/L (ref 134–144)
WBC # BLD AUTO: 2.9 X10E3/UL (ref 3.4–10.8)

## 2025-06-22 LAB
BACTERIA SPEC RESP CULT: ABNORMAL
BACTERIA SPEC RESP CULT: ABNORMAL

## 2025-06-25 ENCOUNTER — HOSPITAL ENCOUNTER (EMERGENCY)
Facility: HOSPITAL | Age: 35
Discharge: HOME | End: 2025-06-25
Attending: EMERGENCY MEDICINE | Admitting: EMERGENCY MEDICINE
Payer: COMMERCIAL

## 2025-06-25 ENCOUNTER — APPOINTMENT (OUTPATIENT)
Dept: CT IMAGING | Facility: HOSPITAL | Age: 35
End: 2025-06-25
Payer: COMMERCIAL

## 2025-06-25 VITALS
SYSTOLIC BLOOD PRESSURE: 113 MMHG | HEIGHT: 66 IN | OXYGEN SATURATION: 99 % | TEMPERATURE: 98 F | DIASTOLIC BLOOD PRESSURE: 75 MMHG | RESPIRATION RATE: 18 BRPM | WEIGHT: 183 LBS | HEART RATE: 58 BPM | BODY MASS INDEX: 29.41 KG/M2

## 2025-06-25 DIAGNOSIS — R59.1 LYMPHADENOPATHY: Primary | ICD-10-CM

## 2025-06-25 DIAGNOSIS — J02.9 SORE THROAT: ICD-10-CM

## 2025-06-25 LAB
ALBUMIN SERPL-MCNC: 3.6 G/DL (ref 3.5–5.2)
ALBUMIN/GLOB SERPL: 1.1 G/DL
ALP SERPL-CCNC: 558 U/L (ref 39–117)
ALT SERPL W P-5'-P-CCNC: 433 U/L (ref 1–33)
ANION GAP SERPL CALCULATED.3IONS-SCNC: 11.5 MMOL/L (ref 5–15)
APAP SERPL-MCNC: <5 MCG/ML (ref 0–30)
AST SERPL-CCNC: 183 U/L (ref 1–32)
BASOPHILS # BLD MANUAL: 0.22 10*3/MM3 (ref 0–0.2)
BASOPHILS NFR BLD MANUAL: 2 % (ref 0–1.5)
BILIRUB SERPL-MCNC: 0.5 MG/DL (ref 0–1.2)
BUN SERPL-MCNC: 8.2 MG/DL (ref 6–20)
BUN/CREAT SERPL: 11.9 (ref 7–25)
CALCIUM SPEC-SCNC: 8.8 MG/DL (ref 8.6–10.5)
CHLORIDE SERPL-SCNC: 100 MMOL/L (ref 98–107)
CO2 SERPL-SCNC: 23.5 MMOL/L (ref 22–29)
CREAT SERPL-MCNC: 0.69 MG/DL (ref 0.57–1)
CRP SERPL-MCNC: 1.15 MG/DL (ref 0–0.5)
DEPRECATED RDW RBC AUTO: 45.1 FL (ref 37–54)
EGFRCR SERPLBLD CKD-EPI 2021: 117 ML/MIN/1.73
EOSINOPHIL # BLD MANUAL: 0 10*3/MM3 (ref 0–0.4)
EOSINOPHIL NFR BLD MANUAL: 0 % (ref 0.3–6.2)
ERYTHROCYTE [DISTWIDTH] IN BLOOD BY AUTOMATED COUNT: 14.1 % (ref 12.3–15.4)
ERYTHROCYTE [SEDIMENTATION RATE] IN BLOOD: 59 MM/HR (ref 0–20)
FERRITIN SERPL-MCNC: 168 NG/ML (ref 15–150)
GLOBULIN UR ELPH-MCNC: 3.4 GM/DL
GLUCOSE SERPL-MCNC: 85 MG/DL (ref 65–99)
HAV IGM SERPL QL IA: NEGATIVE
HBV CORE IGM SERPL QL IA: NEGATIVE
HBV SURFACE AG SERPL QL IA: NEGATIVE
HCT VFR BLD AUTO: 42.1 % (ref 34–46.6)
HCV AB SERPL QL IA: NON REACTIVE
HCV AB SERPL QL IA: NORMAL
HGB BLD-MCNC: 13.6 G/DL (ref 12–15.9)
HYPOCHROMIA BLD QL: ABNORMAL
LYMPHOCYTES # BLD MANUAL: 7.44 10*3/MM3 (ref 0.7–3.1)
LYMPHOCYTES NFR BLD MANUAL: 3 % (ref 5–12)
MCH RBC QN AUTO: 27.9 PG (ref 26.6–33)
MCHC RBC AUTO-ENTMCNC: 32.3 G/DL (ref 31.5–35.7)
MCV RBC AUTO: 86.4 FL (ref 79–97)
MONOCYTES # BLD: 0.33 10*3/MM3 (ref 0.1–0.9)
NEUTROPHILS # BLD AUTO: 2.95 10*3/MM3 (ref 1.7–7)
NEUTROPHILS NFR BLD MANUAL: 24 % (ref 42.7–76)
NEUTS BAND NFR BLD MANUAL: 3 % (ref 0–5)
NRBC SPEC MANUAL: 0 /100 WBC (ref 0–0.2)
PLAT MORPH BLD: NORMAL
PLATELET # BLD AUTO: 202 10*3/MM3 (ref 140–450)
PMV BLD AUTO: 10.9 FL (ref 6–12)
POTASSIUM SERPL-SCNC: 3.9 MMOL/L (ref 3.5–5.2)
PROT SERPL-MCNC: 7 G/DL (ref 6–8.5)
RBC # BLD AUTO: 4.87 10*6/MM3 (ref 3.77–5.28)
SODIUM SERPL-SCNC: 135 MMOL/L (ref 136–145)
VARIANT LYMPHS NFR BLD MANUAL: 14 % (ref 0–5)
VARIANT LYMPHS NFR BLD MANUAL: 54 % (ref 19.6–45.3)
WBC MORPH BLD: NORMAL
WBC NRBC COR # BLD AUTO: 10.94 10*3/MM3 (ref 3.4–10.8)
WRITTEN AUTHORIZATION: NORMAL

## 2025-06-25 PROCEDURE — 85652 RBC SED RATE AUTOMATED: CPT | Performed by: PHYSICIAN ASSISTANT

## 2025-06-25 PROCEDURE — 80053 COMPREHEN METABOLIC PANEL: CPT | Performed by: PHYSICIAN ASSISTANT

## 2025-06-25 PROCEDURE — 80143 DRUG ASSAY ACETAMINOPHEN: CPT | Performed by: EMERGENCY MEDICINE

## 2025-06-25 PROCEDURE — 70491 CT SOFT TISSUE NECK W/DYE: CPT

## 2025-06-25 PROCEDURE — 85025 COMPLETE CBC W/AUTO DIFF WBC: CPT | Performed by: PHYSICIAN ASSISTANT

## 2025-06-25 PROCEDURE — 99285 EMERGENCY DEPT VISIT HI MDM: CPT

## 2025-06-25 PROCEDURE — 25510000001 IOPAMIDOL 61 % SOLUTION: Performed by: EMERGENCY MEDICINE

## 2025-06-25 PROCEDURE — 36415 COLL VENOUS BLD VENIPUNCTURE: CPT

## 2025-06-25 PROCEDURE — 86140 C-REACTIVE PROTEIN: CPT | Performed by: PHYSICIAN ASSISTANT

## 2025-06-25 RX ORDER — IOPAMIDOL 612 MG/ML
100 INJECTION, SOLUTION INTRAVASCULAR
Status: COMPLETED | OUTPATIENT
Start: 2025-06-25 | End: 2025-06-25

## 2025-06-25 RX ADMIN — IOPAMIDOL 85 ML: 612 INJECTION, SOLUTION INTRAVENOUS at 14:10

## 2025-06-25 NOTE — ED PROVIDER NOTES
Subjective  History of Present Illness:    Chief Complaint: Sick  History of Present Illness: Patient is a 34-year-old female presents to the ED today with concerns of swollen lymph nodes, sore throat and recent elevated liver enzymes.  She saw her primary care on the  and was diagnosed with strep throat and received antibiotics and a steroid pack.  She saw her ENT today and received Omnicef.  She states that over the last few months she has experienced unexpected weight loss and night sweats.  She states that the strep throat has not gone better with antibiotics.  She has several swollen lymph nodes that grew in size.  She has a friend that is an RN who she was telling about her elevated liver enzymes and also lymph nodes in recommended that she go to the ER.  Onset: Gradual  Duration: 2 weeks  Exacerbating / Alleviating factors: Tender occipital lymph nodes  Associated symptoms: Sore throat, night sweats, unexpected weight loss      Nurses Notes reviewed and agree, including vitals, allergies, social history and prior medical history.     REVIEW OF SYSTEMS: All systems reviewed and not pertinent unless noted.    Review of Systems   Constitutional:  Positive for unexpected weight change.        Night sweats   HENT:  Positive for sore throat.         Swollen nodes   All other systems reviewed and are negative.      Past Medical History:   Diagnosis Date    Anxiety     Depression        Allergies:    Penicillins      Past Surgical History:   Procedure Laterality Date    EAR TUBES Bilateral          Social History     Socioeconomic History    Marital status: Single   Tobacco Use    Smoking status: Former     Current packs/day: 0.00     Average packs/day: 0.3 packs/day for 2.0 years (0.5 ttl pk-yrs)     Types: Cigarettes     Start date: 2021     Quit date: 2023     Years since quittin.9    Smokeless tobacco: Never   Vaping Use    Vaping status: Every Day    Start date: 2023    Substances: Nicotine     "Devices: Disposable   Substance and Sexual Activity    Alcohol use: Never    Drug use: Never    Sexual activity: Defer         Family History   Problem Relation Age of Onset    Hypertension Mother     Hyperthyroidism Mother        Objective  Physical Exam:  /75   Pulse 58   Temp 98 °F (36.7 °C) (Oral)   Resp 18   Ht 167.6 cm (66\")   Wt 83 kg (183 lb)   SpO2 99%   BMI 29.54 kg/m²      Physical Exam  Vitals and nursing note reviewed.   Constitutional:       Appearance: She is well-developed.   HENT:      Head: Normocephalic.   Eyes:      Extraocular Movements: Extraocular movements intact.   Neck:      Comments: Swollen nonmobile hard submandibular lymph nodes bilaterally and swollen right sided occipital lymph node  Cardiovascular:      Rate and Rhythm: Normal rate and regular rhythm.   Pulmonary:      Effort: Pulmonary effort is normal.      Breath sounds: Normal breath sounds.   Abdominal:      Palpations: Abdomen is soft.   Musculoskeletal:         General: Normal range of motion.      Cervical back: Normal range of motion and neck supple.   Skin:     General: Skin is warm and dry.   Neurological:      Mental Status: She is alert and oriented to person, place, and time.      Deep Tendon Reflexes: Reflexes are normal and symmetric.           Procedures    ED Course:    ED Course as of 06/25/25 1620 Wed Jun 25, 2025   1200 Reviewed the patient's recent office visit including evaluation treatment plan, labs that showed elevated liver enzymes, hepatitis panel CMV and EBV negative. [CS]   1300 Review of previous  non ED visits, prior labs, prior imaging, available notes from prior evaluations or visits with specialists, medication list, allergies, past medical history, past surgical history  Reviewed the patient's recent ENT visit from today with recommendation of switching from azithromycin to Omnicef. [CS]   1538 I reviewed all labs and CT scan result today, her liver enzymes have elevated compared to " previous, had a discussion with the patient and family at the bedside, did not think that this is primary liver I believe this is a transaminase that could be related to a viral or bacterial infection I also discussed lymphoma because the patient did bring this up, as a concern, I explained that this would need a biopsy to confirm, and if she did not recover after biotics and this lingered that that would be a discussion with the ENT and could be a possibility. [CS]      ED Course User Index  [CS] Simone Weber Jr., MIKE       Lab Results (last 24 hours)       Procedure Component Value Units Date/Time    CBC Auto Differential [[id number]]  (Abnormal) Collected: 06/25/25 1300    Specimen: Blood Updated: 06/25/25 1412     WBC 10.94 10*3/mm3      RBC 4.87 10*6/mm3      Hemoglobin 13.6 g/dL      Hematocrit 42.1 %      MCV 86.4 fL      MCH 27.9 pg      MCHC 32.3 g/dL      RDW 14.1 %      RDW-SD 45.1 fl      MPV 10.9 fL      Platelets 202 10*3/mm3     Narrative:      The previously reported component NRBC is no longer being reported. Previous result was 0.0 /100 WBC (Reference Range: 0.0-0.2 /100 WBC) on 6/25/2025 at 1330 EDT.    Sedimentation Rate [[id number]]  (Abnormal) Collected: 06/25/25 1300    Specimen: Blood Updated: 06/25/25 1320     Sed Rate 59 mm/hr     Manual Differential [[id number]]  (Abnormal) Collected: 06/25/25 1300    Specimen: Blood Updated: 06/25/25 1412     Neutrophil % 24.0 %      Lymphocyte % 54.0 %      Monocyte % 3.0 %      Eosinophil % 0.0 %      Basophil % 2.0 %      Bands %  3.0 %      Atypical Lymphocyte % 14.0 %      Neutrophils Absolute 2.95 10*3/mm3      Lymphocytes Absolute 7.44 10*3/mm3      Monocytes Absolute 0.33 10*3/mm3      Eosinophils Absolute 0.00 10*3/mm3      Basophils Absolute 0.22 10*3/mm3      nRBC 0.0 /100 WBC      Hypochromia Slight/1+     WBC Morphology Normal     Platelet Morphology Normal    Comprehensive Metabolic Panel [[id number]]  (Abnormal) Collected: 06/25/25 1433     Specimen: Blood Updated: 06/25/25 1515     Glucose 85 mg/dL      BUN 8.2 mg/dL      Creatinine 0.69 mg/dL      Sodium 135 mmol/L      Potassium 3.9 mmol/L      Chloride 100 mmol/L      CO2 23.5 mmol/L      Calcium 8.8 mg/dL      Total Protein 7.0 g/dL      Albumin 3.6 g/dL      ALT (SGPT) 433 U/L      AST (SGOT) 183 U/L      Alkaline Phosphatase 558 U/L      Total Bilirubin 0.5 mg/dL      Globulin 3.4 gm/dL      Comment: Calculated Result        A/G Ratio 1.1 g/dL      BUN/Creatinine Ratio 11.9     Anion Gap 11.5 mmol/L      eGFR 117.0 mL/min/1.73     Narrative:      GFR Categories in Chronic Kidney Disease (CKD)              GFR Category          GFR (mL/min/1.73)    Interpretation  G1                    90 or greater        Normal or high (1)  G2                    60-89                Mild decrease (1)  G3a                   45-59                Mild to moderate decrease  G3b                   30-44                Moderate to severe decrease  G4                    15-29                Severe decrease  G5                    14 or less           Kidney failure    (1)In the absence of evidence of kidney disease, neither GFR category G1 or G2 fulfill the criteria for CKD.    eGFR calculation 2021 CKD-EPI creatinine equation, which does not include race as a factor    C-reactive Protein [[id number]]  (Abnormal) Collected: 06/25/25 1433    Specimen: Blood Updated: 06/25/25 1515     C-Reactive Protein 1.15 mg/dL     Acetaminophen Level [[id number]]  (Normal) Collected: 06/25/25 1433    Specimen: Blood Updated: 06/25/25 1606     Acetaminophen <5.0 mcg/mL              CT Soft Tissue Neck With Contrast  Result Date: 6/25/2025  CT SOFT TISSUE NECK W CONTRAST Date of Exam: 6/25/2025 2:00 PM EDT Indication: bilateral submadibular swollen nodes. Comparison: None available. Technique: Axial CT images were obtained of the neck after the uneventful intravenous administration of 85 million Isovue 300.  Reconstructed coronal and  sagittal images were also obtained. Automated exposure control and iterative construction methods were used. Findings: The visualized intracranial contents appear within normal limits. The globes and orbits are within normal limits. Nasopharynx is normal. The palatine tonsils are enlarged with hyperemia of the tonsillar crypts and small bilateral fluid pockets. There is mucosal hyperemia of the nasopharynx bilaterally. No discrete or well-formed abscess. The parapharyngeal fat is preserved. There are numerous bilateral enlarged cervical lymph nodes including a 2.2 x 1.6 cm right level II node (series 2 image 26) and a 2.4 x 1.6 cm left II node (series 2 image 28). There is a prominent left axillary node measuring 2.0 x 1.5 cm. Floor of mouth is normal. Larynx is normal. Visualized portions of the upper trachea and esophagus are normal. Thyroid gland is normal. Bilateral submandibular glands and parotid glands are normal. No abnormal soft tissue mass or fluid collection seen. Bilateral carotid and vertebral arteries are patent without significant stenosis. Bilateral internal jugular veins appear patent. No suspicious lytic or sclerotic bony lesion identified.     Impression: Impression: Bilateral tonsillitis/pharyngitis with small fluid pockets within the tonsils but no discrete abscess. Bilateral cervical chain reactive lymphadenopathy. Electronically Signed: Phyllis Tang MD  6/25/2025 2:49 PM EDT  Workstation ID: [id number]                                                                    Medical Decision Making  Patient Presentation 34-year-old female presented with swollen lymph nodes, and a sore throat, currently on antibiotics    DDX lymphadenopathy, strep, mono, viral illness, lymphoma    Data Review/ Non ED Records /Analysis/Ordering unique tests  Review of previous  non ED visits, prior labs, prior imaging, available notes from prior evaluations or visits with specialists, medication list, allergies, past  medical history, past surgical history        Independent Review Studies  I Personally reviewed all laboratory studies performed in the emergency department     Intervention/Re-evaluation no acute intervention on reevaluation patient remained stable    Independent Clinician discussed the case with my supervising physician Dr. Berumen    Risk Stratification tools/clinical decision rules presented with swollen submandibular lymph node as well as occipital lymph node, previously on a Z-Richie, seen by ENT today, started on Omnicef and steroids, at the urging of her friend, she came to the emergency department she was concerned about lymphoma and her elevated liver enzymes, CT scan soft tissue neck was performed, no acute occlusion, did show reactive lymph nodes, she had an increased results in her liver enzymes, reviewed her labs that showed negative hepatitis panel, CMV and Ava-Barr, this could be reactive transaminase, acetaminophen was also ordered.  I did discuss with the patient urging her to follow-up with ENT and her primary care office to follow this closely including repeat labs, if not improved she may require a biopsy, this is indeterminate at this time and could be related to her current illness.    Shared Decision Making discussed this with patient and family they were agreeable    Disposition patient stable for discharge    Problems Addressed:  Lymphadenopathy: complicated acute illness or injury  Sore throat: complicated acute illness or injury    Amount and/or Complexity of Data Reviewed  Independent Historian: friend  External Data Reviewed: labs, radiology and notes.  Labs: ordered. Decision-making details documented in ED Course.  Radiology: ordered. Decision-making details documented in ED Course.    Risk  Prescription drug management.          Final diagnoses:   Lymphadenopathy   Sore throat           Disposition DISCHARGE    Patient discharged in stable condition.    Reviewed implications of  results, diagnosis, meds, responsibility to follow up, warning signs and symptoms of possible worsening, potential complications and reasons to return to ER.    Patient/Family voiced understanding of above instructions.    Discussed plan for discharge, as there is no emergent indication for admission.  Pt/family is agreeable and understands need for follow up and possible repeat testing.  Pt/family is aware that discharge does not mean that nothing is wrong but that it indicates no emergency is currently present that requires admission and they must continue care with follow-up as given below or with a physician of their choice.     FOLLOW-UP  [facility] EMERGENCY DEPARTMENT  [address] [address] [phone]    If symptoms worsen    Julee Jean PA-C  [address] [id number] [phone]    Schedule an appointment as soon as possible for a visit       Rc Ramos MD  [address]  [facility] [address]  [phone]    Schedule an appointment as soon as possible for a visit            Medication List      No changes were made to your prescriptions during this visit.             Simone Weber Jr. PAAfricaC  06/25/25 [id number]

## 2025-07-01 ENCOUNTER — LAB (OUTPATIENT)
Dept: FAMILY MEDICINE CLINIC | Facility: CLINIC | Age: 35
End: 2025-07-01
Payer: COMMERCIAL

## 2025-07-02 ENCOUNTER — RESULTS FOLLOW-UP (OUTPATIENT)
Dept: FAMILY MEDICINE CLINIC | Facility: CLINIC | Age: 35
End: 2025-07-02
Payer: COMMERCIAL

## 2025-07-02 DIAGNOSIS — R74.8 ELEVATED LIVER ENZYMES: Primary | ICD-10-CM

## 2025-07-02 LAB
ALBUMIN SERPL-MCNC: 4.1 G/DL (ref 3.9–4.9)
ALP SERPL-CCNC: 341 IU/L (ref 44–121)
ALT SERPL-CCNC: 112 IU/L (ref 0–32)
AST SERPL-CCNC: 40 IU/L (ref 0–40)
BASOPHILS # BLD AUTO: 0.1 X10E3/UL (ref 0–0.2)
BASOPHILS NFR BLD AUTO: 2 %
BILIRUB SERPL-MCNC: 0.4 MG/DL (ref 0–1.2)
BUN SERPL-MCNC: 13 MG/DL (ref 6–20)
BUN/CREAT SERPL: 15 (ref 9–23)
CALCIUM SERPL-MCNC: 9.2 MG/DL (ref 8.7–10.2)
CHLORIDE SERPL-SCNC: 98 MMOL/L (ref 96–106)
CO2 SERPL-SCNC: 24 MMOL/L (ref 20–29)
CREAT SERPL-MCNC: 0.84 MG/DL (ref 0.57–1)
EGFRCR SERPLBLD CKD-EPI 2021: 93 ML/MIN/1.73
EOSINOPHIL # BLD AUTO: 0 X10E3/UL (ref 0–0.4)
EOSINOPHIL NFR BLD AUTO: 0 %
ERYTHROCYTE [DISTWIDTH] IN BLOOD BY AUTOMATED COUNT: 13.5 % (ref 11.7–15.4)
FERRITIN SERPL-MCNC: 86 NG/ML (ref 15–150)
GLOBULIN SER CALC-MCNC: 3.5 G/DL (ref 1.5–4.5)
GLUCOSE SERPL-MCNC: 109 MG/DL (ref 70–99)
HAV AB SER QL IA: NEGATIVE
HBV CORE AB SERPL QL IA: NEGATIVE
HBV SURFACE AB SER QL: REACTIVE
HBV SURFACE AG SERPL QL IA: NEGATIVE
HCT VFR BLD AUTO: 42.5 % (ref 34–46.6)
HCV IGG SERPL QL IA: NON REACTIVE
HGB BLD-MCNC: 13.7 G/DL (ref 11.1–15.9)
IMM GRANULOCYTES # BLD AUTO: 0 X10E3/UL (ref 0–0.1)
IMM GRANULOCYTES NFR BLD AUTO: 0 %
IRON SERPL-MCNC: 60 UG/DL (ref 27–159)
LYMPHOCYTES # BLD AUTO: 4.7 X10E3/UL (ref 0.7–3.1)
LYMPHOCYTES NFR BLD AUTO: 63 %
MCH RBC QN AUTO: 28.1 PG (ref 26.6–33)
MCHC RBC AUTO-ENTMCNC: 32.2 G/DL (ref 31.5–35.7)
MCV RBC AUTO: 87 FL (ref 79–97)
MONOCYTES # BLD AUTO: 0.5 X10E3/UL (ref 0.1–0.9)
MONOCYTES NFR BLD AUTO: 7 %
MORPHOLOGY BLD-IMP: ABNORMAL
NEUTROPHILS # BLD AUTO: 2.1 X10E3/UL (ref 1.4–7)
NEUTROPHILS NFR BLD AUTO: 28 %
PLATELET # BLD AUTO: 253 X10E3/UL (ref 150–450)
POTASSIUM SERPL-SCNC: 4.6 MMOL/L (ref 3.5–5.2)
PROT SERPL-MCNC: 7.6 G/DL (ref 6–8.5)
RBC # BLD AUTO: 4.88 X10E6/UL (ref 3.77–5.28)
SODIUM SERPL-SCNC: 138 MMOL/L (ref 134–144)
WBC # BLD AUTO: 7.5 X10E3/UL (ref 3.4–10.8)

## 2025-08-13 ENCOUNTER — LAB (OUTPATIENT)
Dept: FAMILY MEDICINE CLINIC | Facility: CLINIC | Age: 35
End: 2025-08-13
Payer: COMMERCIAL

## 2025-08-14 LAB
ALBUMIN SERPL-MCNC: 4.2 G/DL (ref 3.9–4.9)
ALP SERPL-CCNC: 78 IU/L (ref 44–121)
ALT SERPL-CCNC: 23 IU/L (ref 0–32)
AST SERPL-CCNC: 18 IU/L (ref 0–40)
BILIRUB SERPL-MCNC: 0.5 MG/DL (ref 0–1.2)
BUN SERPL-MCNC: 9 MG/DL (ref 6–20)
BUN/CREAT SERPL: 13 (ref 9–23)
CALCIUM SERPL-MCNC: 9.3 MG/DL (ref 8.7–10.2)
CHLORIDE SERPL-SCNC: 104 MMOL/L (ref 96–106)
CO2 SERPL-SCNC: 21 MMOL/L (ref 20–29)
CREAT SERPL-MCNC: 0.69 MG/DL (ref 0.57–1)
EGFRCR SERPLBLD CKD-EPI 2021: 117 ML/MIN/1.73
GLOBULIN SER CALC-MCNC: 2.5 G/DL (ref 1.5–4.5)
GLUCOSE SERPL-MCNC: 88 MG/DL (ref 70–99)
POTASSIUM SERPL-SCNC: 4.7 MMOL/L (ref 3.5–5.2)
PROT SERPL-MCNC: 6.7 G/DL (ref 6–8.5)
SODIUM SERPL-SCNC: 138 MMOL/L (ref 134–144)